# Patient Record
Sex: FEMALE | Race: WHITE
[De-identification: names, ages, dates, MRNs, and addresses within clinical notes are randomized per-mention and may not be internally consistent; named-entity substitution may affect disease eponyms.]

---

## 2017-03-01 ENCOUNTER — HOSPITAL ENCOUNTER (OUTPATIENT)
Dept: HOSPITAL 39 - MRI | Age: 72
Discharge: HOME | End: 2017-03-01
Attending: NURSE PRACTITIONER
Payer: MEDICARE

## 2017-03-01 DIAGNOSIS — S06.9X0S: Primary | ICD-10-CM

## 2017-03-01 DIAGNOSIS — R10.9: ICD-10-CM

## 2017-03-01 NOTE — US
EXAM DESCRIPTION: 



Liver



CLINICAL HISTORY: 



RIGHT SIDE ABD PAIN



COMPARISON: 



None available.



FINDINGS: 



Aorta: Partially visualized without apparent aneurysm.

IVC: Visualized portions normal.

Ascites: None.

Pancreas: Partially obscured by overlying bowel gas but

visualized portions normal.

Liver: No mass, hepatomegaly or biliary duct dilation.

Physiologic flow is noted in the main portal vein.

Gallbladder/Common Duct: A small amount of debris in the

gallbladder without gallstone, gallbladder wall thickening,

pericholecystic fluid or biliary duct dilation. The common duct

measures 2 or 3 mm diameter at the randell hepatis.

Right Kidney: Not visualized.



IMPRESSION: 



Small amount of debris in the gallbladder, but no cholelithiasis,

biliary duct dilation, liver mass or additional abnormality to

explain patient's symptoms.



Electronically signed by:  Paramjit Felix MD  3/1/2017 5:39 PM CST

## 2017-03-01 NOTE — MRI
EXAM DESCRIPTION: 



Brain w/oContrast



CLINICAL HISTORY: 



BRAIN INJURY,WITHOUT LOSS OF CONSCIOUSNESS



COMPARISON: 



CT of the brain on May 29, 2015



TECHNIQUE: 



Multi planar, multi sequence MRI evaluation of the brain without

contrast



FINDINGS: 



Scattered bihemispheric white matter hyperintensities,

nonspecific and most likely microvascular ischemic changes.



No intracranial hemorrhage, infarction, or mass lesion. Normal

gray-white matter differentiation

Ventricles are normal in size and configuration

Normal flow voids are present in the major intracranial arteries

and dural venous sinuses

No abnormality is seen along the course of the cranial nerves.

Normal appearance of the temporal bones

Orbits are grossly normal

Normal aeration of paranasal sinuses and mastoid air cells



IMPRESSION: 





1. Nonacute MRI of the brain without contrast.

2. Microvascular ischemic changes.



Electronically signed by:  Yasir Rose MD  3/1/2017 6:40 PM CST

## 2017-08-21 ENCOUNTER — HOSPITAL ENCOUNTER (OUTPATIENT)
Dept: HOSPITAL 39 - CT | Age: 72
Discharge: HOME | End: 2017-08-21
Attending: NURSE PRACTITIONER
Payer: MEDICARE

## 2017-08-21 DIAGNOSIS — G81.90: Primary | ICD-10-CM

## 2017-08-21 NOTE — CT
Study: CT of the Head. 



Indication: LEFT HEMIPARESIS



Technique: Axial CT images of the head were acquired without

intravenous contrast. This exam was performed according to our

departmental dose-optimization program, which includes automated

exposure control, adjustment of the mA and/or kV according to

patient size and/or use of iterative reconstruction technique.



Comparison: None.



Findings:



No CT evidence of acute ischemia, acute hemorrhage, mass, mass

effect, midline shift, or extra-axial fluid collection. 

Ventricles are normal in configuration without hydrocephalus. 

Patchy hypoattenuation of the periventricular and subcortical

white matter noted. This is nonspecific but most consistent with

chronic microvascular ischemic change. Global parenchymal volume

loss and intracranial atherosclerosis noted as well. 

Paranasal sinuses are adequately aerated. 

Mastoid air cells are adequately aerated. 

Osseous structures and soft tissues are unremarkable. 



Impression: 



1.  No CT evidence of acute intracranial abnormality. If

persistent concern for acute ischemia, correlation with MRI

recommended.

2.  Senescent changes. 



Electronically signed by:  Yuriy May MD  8/21/2017 1:12 PM CDT

Workstation: [id number]

## 2018-12-14 ENCOUNTER — HOSPITAL ENCOUNTER (OUTPATIENT)
Dept: HOSPITAL 39 - US | Age: 73
End: 2018-12-14
Attending: NURSE PRACTITIONER
Payer: MEDICARE

## 2018-12-14 DIAGNOSIS — M79.605: Primary | ICD-10-CM

## 2018-12-14 NOTE — US
EXAM DESCRIPTION: Venous,Lower Extremity LT



CLINICAL HISTORY: PN IN LEFT LOWER LIMB



COMPARISON: None Available.



TECHNIQUE: Left lower extremity venous duplex with grayscale and

color Doppler images



FINDINGS:



Doppler evaluation of the left  lower extremity deep veins was

performed. Normal color flow is seen in the common femoral,

superficial femoral, profunda femoral and greater saphenous

veins. Normal flow is seen in the popliteal vein and veins below

the knee in the calf.



Normal venous compressibility and flow augmentation.



IMPRESSION:



Negative for evidence of deep venous thrombosis on left lower

extremity venous Doppler sonogram.



Electronically signed by:  Levy Jones MD  12/14/2018 3:27

PM CST Workstation: [id number]

## 2019-03-04 ENCOUNTER — HOSPITAL ENCOUNTER (OUTPATIENT)
Dept: HOSPITAL 39 - AMB | Age: 74
Discharge: HOME | End: 2019-03-04
Attending: OPHTHALMOLOGY
Payer: MEDICARE

## 2019-03-04 DIAGNOSIS — Z79.899: ICD-10-CM

## 2019-03-04 DIAGNOSIS — I10: ICD-10-CM

## 2019-03-04 DIAGNOSIS — H25.012: Primary | ICD-10-CM

## 2019-03-04 PROCEDURE — 66984 XCAPSL CTRC RMVL W/O ECP: CPT

## 2019-03-04 PROCEDURE — 00142 ANES PX ON EYE LENS SURGERY: CPT

## 2019-04-22 ENCOUNTER — HOSPITAL ENCOUNTER (OUTPATIENT)
Dept: HOSPITAL 39 - AMB | Age: 74
Discharge: HOME | End: 2019-04-22
Attending: OPHTHALMOLOGY
Payer: MEDICARE

## 2019-04-22 DIAGNOSIS — H25.011: Primary | ICD-10-CM

## 2019-04-22 PROCEDURE — 66984 XCAPSL CTRC RMVL W/O ECP: CPT

## 2019-04-22 PROCEDURE — 00142 ANES PX ON EYE LENS SURGERY: CPT
